# Patient Record
Sex: FEMALE | Employment: FULL TIME | URBAN - METROPOLITAN AREA
[De-identification: names, ages, dates, MRNs, and addresses within clinical notes are randomized per-mention and may not be internally consistent; named-entity substitution may affect disease eponyms.]

---

## 2017-11-08 ENCOUNTER — HOSPITAL ENCOUNTER (EMERGENCY)
Age: 32
Discharge: HOME | End: 2017-11-08
Attending: EMERGENCY MEDICINE
Payer: SELF-PAY

## 2017-11-08 VITALS
BODY MASS INDEX: 32.44 KG/M2 | HEIGHT: 64 IN | WEIGHT: 190 LBS | RESPIRATION RATE: 18 BRPM | HEART RATE: 81 BPM | SYSTOLIC BLOOD PRESSURE: 160 MMHG | OXYGEN SATURATION: 100 % | TEMPERATURE: 97.9 F | DIASTOLIC BLOOD PRESSURE: 111 MMHG

## 2017-11-08 DIAGNOSIS — L98.9 SKIN LESION OF RIGHT LEG: Primary | ICD-10-CM

## 2017-11-08 PROCEDURE — 99282 EMERGENCY DEPT VISIT SF MDM: CPT

## 2017-11-08 RX ORDER — HYDROCODONE BITARTRATE AND ACETAMINOPHEN 5; 325 MG/1; MG/1
1-2 TABLET ORAL
Qty: 12 TAB | Refills: 0 | Status: SHIPPED | OUTPATIENT
Start: 2017-11-08 | End: 2018-05-08

## 2017-11-08 RX ORDER — CEPHALEXIN 500 MG/1
500 CAPSULE ORAL 3 TIMES DAILY
Qty: 21 CAP | Refills: 0 | Status: SHIPPED | OUTPATIENT
Start: 2017-11-08 | End: 2017-11-15

## 2017-11-08 RX ORDER — SULFAMETHOXAZOLE AND TRIMETHOPRIM 800; 160 MG/1; MG/1
2 TABLET ORAL 2 TIMES DAILY
Qty: 40 TAB | Refills: 0 | Status: SHIPPED | OUTPATIENT
Start: 2017-11-08 | End: 2017-11-18

## 2017-11-09 NOTE — DISCHARGE INSTRUCTIONS

## 2017-11-09 NOTE — ED PROVIDER NOTES
HPI Comments: 26-year-old female presents to the emergency room for evaluation of insect bites to the right leg. States she noted them yesterday and they got bigger and more tender and painful today. She denies fever or chills. No nausea or vomiting. Patient does not remember an insect biting her but states she did see a spider in her house. She reports itching the area and then has to get up. She has not taken anything for it. No alleviating factors. No true known precipitating events. Pain described as aching. Pain rated 8/10. Social hx  Nonsmoker  No alcohol    Patient is a 27 y.o. female presenting with Insect Bite. The history is provided by the patient. Insect Bite   Pertinent negatives include no abdominal pain, no headaches and no shortness of breath. History reviewed. No pertinent past medical history. Past Surgical History:   Procedure Laterality Date    HX GYN               History reviewed. No pertinent family history. Social History     Social History    Marital status: SINGLE     Spouse name: N/A    Number of children: N/A    Years of education: N/A     Occupational History    Not on file. Social History Main Topics    Smoking status: Never Smoker    Smokeless tobacco: Never Used    Alcohol use No    Drug use: Not on file    Sexual activity: Not on file     Other Topics Concern    Not on file     Social History Narrative         ALLERGIES: Review of patient's allergies indicates no known allergies. Review of Systems   Constitutional: Negative for chills and fever. Respiratory: Negative for cough, chest tightness and shortness of breath. Gastrointestinal: Negative for abdominal pain, nausea and vomiting. Musculoskeletal: Negative for back pain, neck pain and neck stiffness. Skin: Positive for wound. Negative for color change and rash. Neurological: Negative for dizziness, weakness, light-headedness and headaches.        Vitals:    17 2059 diagnosis as well a list of reasons why they would want to return to the ER prior to their follow-up appointment should their condition change.                Amount and/or Complexity of Data Reviewed  Discuss the patient with other providers: yes (ER attending-[place])      ED Course       Procedures

## 2018-05-08 ENCOUNTER — OFFICE VISIT (OUTPATIENT)
Dept: FAMILY MEDICINE CLINIC | Age: 33
End: 2018-05-08

## 2018-05-08 VITALS
SYSTOLIC BLOOD PRESSURE: 148 MMHG | WEIGHT: 195.5 LBS | TEMPERATURE: 98.7 F | HEIGHT: 66 IN | DIASTOLIC BLOOD PRESSURE: 82 MMHG | RESPIRATION RATE: 20 BRPM | HEART RATE: 76 BPM | OXYGEN SATURATION: 95 % | BODY MASS INDEX: 31.42 KG/M2

## 2018-05-08 DIAGNOSIS — I10 ESSENTIAL HYPERTENSION: Primary | ICD-10-CM

## 2018-05-08 DIAGNOSIS — G43.009 MIGRAINE WITHOUT AURA AND WITHOUT STATUS MIGRAINOSUS, NOT INTRACTABLE: ICD-10-CM

## 2018-05-08 RX ORDER — SUMATRIPTAN 20 MG/1
1 SPRAY NASAL AS NEEDED
Qty: 1 CONTAINER | Refills: 0 | Status: SHIPPED | OUTPATIENT
Start: 2018-05-08 | End: 2018-05-09

## 2018-05-08 RX ORDER — AMLODIPINE BESYLATE 5 MG/1
5 TABLET ORAL DAILY
Qty: 30 TAB | Refills: 1 | Status: SHIPPED | OUTPATIENT
Start: 2018-05-08 | End: 2021-03-04 | Stop reason: ALTCHOICE

## 2018-05-08 NOTE — PROGRESS NOTES
S: Shakira Stafford is a 26 y.o. female who presents for establish care, headache, accompanied by daughter     Assessment/Plan:  Establish care  1. Essential hypertension  -hx of high BP, ED visit 1.5 yrs ago d/t high BP  -trial: amlodipine 5 mg daily  -pt to take BP 2-3x week and keep log, bring to next appt, reviewed s/s of hypo-hypertension    2. Migraine without aura and without status migrainosus, not intractable  -hx of HA, 1-2 migraines per week on average  -trial: imitrex 20mg nasal  Spray  -pt to keep log of triggers, supportive instructions given     RTC 4-6 weeks for CPE, BP check      2 migraines a week   Comes up right side of head  +nausea,   +pressure   +shoulder pain  Upset stomach  Movement makes her ill  Takes excedrin migraine and it works, but not enough  Can go to sleep and may wake up with it, but HA not as bad   Doesn't drink a lot of water   Affects her fxn at work - people comment she is slower    High BP  Went to ED 1 1/2 years ago for high BP  Gave her 3 bags of fluid then d/t dehydration  Given HCTZ but stopped taking it because it made her urinate too much    Family History:  Cardiovascular -mother    PHQ over the last two weeks 5/8/2018   Little interest or pleasure in doing things Not at all   Feeling down, depressed or hopeless Not at all   Total Score PHQ 2 0     Social History:  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     I reviewed the following:  Past Medical History:   Diagnosis Date    Headache Current Outpatient Prescriptions   Medication Sig Dispense Refill    HYDROcodone-acetaminophen (NORCO) 5-325 mg per tablet Take 1-2 Tabs by mouth every six (6) hours as needed for Pain. Max Daily Amount: 8 Tabs. 12 Tab 0    butalbital-acetaminophen-caff (FIORICET) -40 mg per capsule Take 1 Cap by mouth every four (4) hours as needed for Pain. Max Daily Amount: 6 Caps. 12 Cap 0    methocarbamol (ROBAXIN-750) 750 mg tablet Take 1 Tab by mouth four (4) times daily. 30 Tab 0       No Known Allergies    O: VS:   Visit Vitals    /82 (BP 1 Location: Left arm, BP Patient Position: Sitting)    Pulse 76    Temp 98.7 °F (37.1 °C) (Oral)    Resp 20    Ht 5' 5.5\" (1.664 m)    Wt 195 lb 8 oz (88.7 kg)    SpO2 95%    BMI 32.04 kg/m2       GENERAL: [facility] is in no acute distress. Non-toxic. Well nourished. Well developed. Appropriately groomed. HEAD:  Normocephalic. Atraumatic. Non tender sinuses x 4. EYE: PERRLA. EOMs intact. Sclera anicteric without injection. No drainage or discharge. RESP: Breath sounds are symmetrical bilaterally. Unlabored without SOB. Speaking in full sentences. Clear to auscultation bilaterally anteriorly and posteriorly. No wheezes. No rales or rhonchi. CV: normal rate. Regular rhythm. S1, S2 audible. No murmur noted. No rubs, clicks or gallops noted. No carotid bruits noted. SKIN: Skin is warm and dry. Turgor is normal.   NEURO:  awake, alert and oriented to person, place, and time and event. Clear speech. Muscle strength is +5/5 x 4 extremities. Sensation is intact to light touch bilaterally. Steady gait. HEME/LYMPH: peripheral pulses palpable 2+ x 4 extremities. No peripheral edema is noted. PSYCH: appropriate behavior, dress and thought processes. Good eye contact. Clear and coherent speech. Full affect.  Good insight.   _____________________________________________________________  Reviewed warning signs: worst HA of life, changes in vision, weakness, numbness or tingling, or fever and advised to seek immediate medical attention if any of these sx develop    Patient education was done. Advised on nutrition, physical activity, weight management, tobacco, alcohol and safety. Counseling included discussion of diagnosis, differentials, treatment options, prescribed treatment, warning signs and follow up. Medication risks/benefits,interactions and alternatives discussed with patient.      Patient verbalized understanding and agreed to plan of care. Patient was given an after visit summary which included current diagnoses, medications and vital signs. Follow up in 1-2 weeks as needed or if symptoms progress.

## 2018-05-08 NOTE — PROGRESS NOTES
Chief Complaint   Patient presents with    Establish Care    Migraine       Dorna Show  Identified pt with two pt identifiers(name and ). Chief Complaint   Patient presents with    Establish Care    Migraine       1. Have you been to the ER, urgent care clinic since your last visit? No   Hospitalized since your last visit? Yes/No    2. Have you seen or consulted any other health care providers outside of the [address] [place] [address] since your last visit? Include any pap smears or colon screening. Yes/No    Today's provider has been notified of reason for visit, vitals and flowsheets obtained on patients.      Patient received paperwork for advance directive during previous visit but has not completed at this time     Reviewed record In preparation for visit, huddled with provider and have obtained necessary documentation      Health Maintenance Due   Topic    DTaP/Tdap/Td series (1 - Tdap)    PAP AKA CERVICAL CYTOLOGY        Wt Readings from Last 3 Encounters:   18 195 lb 8 oz (88.7 kg)   17 190 lb (86.2 kg)   16 193 lb (87.5 kg)     Temp Readings from Last 3 Encounters:   18 98.7 °F (37.1 °C) (Oral)   17 97.9 °F (36.6 °C)   16 98 °F (36.7 °C)     BP Readings from Last 3 Encounters:   18 148/82   17 (!) 160/111   16 132/78     Pulse Readings from Last 3 Encounters:   18 76   17 81   16 (!) 58     Vitals:    18 1448   BP: 148/82   Pulse: 76   Resp: 20   Temp: 98.7 °F (37.1 °C)   TempSrc: Oral   SpO2: 95%   Weight: 195 lb 8 oz (88.7 kg)   Height: 5' 5.5\" (1.664 m)         Learning Assessment:  :     Learning Assessment 2018   PRIMARY LEARNER Patient   HIGHEST LEVEL OF EDUCATION - PRIMARY LEARNER  GRADUATED HIGH SCHOOL OR GED   BARRIERS PRIMARY LEARNER NONE   CO-LEARNER CAREGIVER No   PRIMARY LANGUAGE ENGLISH   LEARNER PREFERENCE PRIMARY DEMONSTRATION   ANSWERED BY patient   RELATIONSHIP SELF       Depression Screening:  : PHQ over the last two weeks 5/8/2018   Little interest or pleasure in doing things Not at all   Feeling down, depressed or hopeless Not at all   Total Score PHQ 2 0       Fall Risk Assessment:  :     No flowsheet data found. Abuse Screening:  :     No flowsheet data found. ADL Screening:  :     ADL Assessment 5/8/2018   Feeding yourself No Help Needed   Getting from bed to chair No Help Needed   Getting dressed No Help Needed   Bathing or showering No Help Needed   Walk across the room (includes cane/walker) No Help Needed   Using the telphone No Help Needed   Taking your medications No Help Needed   Preparing meals No Help Needed   Managing money (expenses/bills) No Help Needed   Moderately strenuous housework (laundry) No Help Needed   Shopping for personal items (toiletries/medicines) No Help Needed   Shopping for groceries No Help Needed   Driving No Help Needed   Climbing a flight of stairs No Help Needed   Getting to places beyond walking distances No Help Needed                 Medication reconciliation up to date and corrected with patient at this time.

## 2018-05-08 NOTE — PATIENT INSTRUCTIONS
1) Migraine  Migraine headaches are one of the most disabling illnesses in the world. Although they can affect men, women and children, women have a higher rate of migraine headaches. Lifestyle modifications that must be started to prevent migraines include eating a healthy diet, daily exercise, getting a minimum of 7-8 hours sleep at night, and stop smoking. Additionally, keep a journal of when migraines occur, food eaten, stress, etc to better identify triggers. Treatment is aimed at preventative (daily medication) or abortive (symptom management once migraine occurs). Abortive treatment includes over the counter NSAIDS, (such as Excedrin Migraine, naproxen, ibuprofen), lidocaine cream and triptans. Preventative treatment includes beta blockers, antidepressants and anticonvulsant medications. Please note that Fioricet is NOT recommended as a treatment for migraines. Imitrex (sumitriptan) nasal spray  Use when you feel a migraine coming on. Spray once in only one nostril. (Use the opposite hand of the nostril you are spraying and look down at your toes when you administer the nasal spray. This ensures the spray is applied to the nasal tissue properly.)  If no relief, can do another spray in opposite nostril in 2 hours. Do not exceed 2 sprays in 24 hour period. Complementary Alternative Medicine (CAM) for Migraines    The following therapies have been shown to help reduce the frequency and pain intensity associated with migraines:  1) Yoga - reduces anxiety/depression, helps with back pain as well. Therapy: 5 times per week  2) Aerobic exercise - affects cardiopulmonary, inflammatory and neurovascular processes. Additionally, endorphins released during exercise are \"natural\" painkllers  Therapy: individual, based on health and fitness level  2) Butterbur (Petasites hyubrius) - an herb with anti-inflammatory properties;    Dose: 50-75 mg twice a day  3) Feverfew (tanacetum parthenium) - an herb used for second line treatment with anti-inflammatory properties. Dose:  mg/daily    Massage of the trapezius and supraspinatus fibers (areas around the neck and shoulders) may help reduce pain intensity during migraines     Some migraine sufferers may have low levels of minerals and vitamins used at the cellular level. Taking supplements of Coenzyme Q10 (150-300mg/day), magnesium (400mg/daily) and/or riboflavin (400mg/daily) may help prevent migraines. There is a supplement called Migrelief - which is a combination of things that are typically low in migraine sufferers (riboflavin, magnesium, feverfew, Chasteberry (can reduce effectiveness of OBC bc estrogen), L-Theanine (amino acid) D-Biotin (BS stabilizers)    As with all supplements, herbs and vitamins, please be aware of any interactions that might occur with current medications. If headache becomes \"the worst headache of your life\", you have changes in vision, experience weakness, numbness or tingling, or spike a fever, seek immediate medical attention, call 911.      2) Healthy eating and good hydration is very important. Fair Life milk is a healthy choice in milk products. It is double filtered to remove much of the lactose (sugar found in milk) and recommended by trainers and nutritionists. There is 13 g of protein in a serving, so it is an easy way to increase your protein and calcium intake. 3) Based on your history and today's BP reading of 148/82, we will start amlodipine 5mg for high blood pressure. Please check your blood pressure through the week at staggered times in the day. (If you check in the morning, it should be at least one hour after your morning blood pressure medications.)  Arm monitors are most accurate. If you use a wrist monitor, make sure your wrist is at heart level. You can bring your home monitor to your next visit and have it calibrated with the machine in the office to gauge your readings.      Keep a written record of your blood pressure readings and bring it to each appointment. If your systolic blood pressure is consistently greater than 140mmHg or less than 100mmHg then please schedule an office meeting. Cardiac symptoms that would need immediate attention include: uncomfortable pressure, squeezing, fullness or pain in the center of your chest. Pain or discomfort in one or both arms, the back, neck, jaw or stomach. Shortness of breath with or without chest discomfort, breaking out in a cold sweat, nausea or lightheadedness.

## 2018-05-08 NOTE — MR AVS SNAPSHOT
[address] [place] 
[address] [facility] [address] 
[phone] Patient: Lloyd Hendricks MRN: [id number] JUT:[id number] Visit Information Date & Time Provider Department Dept. Phone Encounter #  
 5/8/2018  3:00 PM Abe Johnson NP [facility] [phone] Upcoming Health Maintenance Date Due DTaP/Tdap/Td series (1 - Tdap) 5/8/2018* PAP AKA CERVICAL CYTOLOGY 8/8/2018* Influenza Age 5 to Adult 8/1/2018 *Topic was postponed. The date shown is not the original due date. Allergies as of 5/8/2018  Review Complete On: 5/8/2018 By: Abe Johnson NP No Known Allergies Current Immunizations  Never Reviewed No immunizations on file. Not reviewed this visit You Were Diagnosed With   
  
 Codes Comments Essential hypertension    -  Primary ICD-10-CM: I10 
ICD-9-CM: 401.9 Migraine without aura and without status migrainosus, not intractable     ICD-10-CM: M50.690 ICD-9-CM: 346.10 Vitals BP Pulse Temp Resp Height(growth percentile) Weight(growth percentile) 148/82 (BP 1 Location: Left arm, BP Patient Position: Sitting) 76 98.7 °F (37.1 °C) (Oral) 20 5' 5.5\" (1.664 m) 195 lb 8 oz (88.7 kg) SpO2 BMI OB Status Smoking Status 95% 32.04 kg/m2 Having regular periods Never Smoker BMI and BSA Data Body Mass Index Body Surface Area 32.04 kg/m 2 2.02 m 2 Preferred Pharmacy Pharmacy Name Phone [address] [phone] Your Updated Medication List  
  
   
This list is accurate as of 5/8/18  3:35 PM.  Always use your most recent med list. amLODIPine 5 mg tablet Commonly known as:  Colon Inks Take 1 Tab by mouth daily. SUMAtriptan 20 mg/actuation nasal spray Commonly known as:  IMITREX  
1 Spray by Right Nostril route as needed for Migraine. Prescriptions Sent to Pharmacy Refills SUMAtriptan (IMITREX) 20 mg/actuation nasal spray 0 [place] by Right Nostril route as needed for Migraine. Class: Normal  
 Pharmacy: [facility] [address] [facility] Ph #: [phone] Route: Right Nostril  
 amLODIPine (NORVASC) 5 mg tablet 1 Sig: Take 1 Tab by mouth daily. Class: Normal  
 Pharmacy: [facility] [address] [facility] Ph #: [phone] Route: Oral  
  
Patient Instructions 1) Migraine Migraine headaches are one of the most disabling illnesses in the world. Although they can affect men, women and children, women have a higher rate of migraine headaches. Lifestyle modifications that must be started to prevent migraines include eating a healthy diet, daily exercise, getting a minimum of 7-8 hours sleep at night, and stop smoking. Additionally, keep a journal of when migraines occur, food eaten, stress, etc to better identify triggers. Treatment is aimed at preventative (daily medication) or abortive (symptom management once migraine occurs). Abortive treatment includes over the counter NSAIDS, (such as Excedrin Migraine, naproxen, ibuprofen), lidocaine cream and triptans. Preventative treatment includes beta blockers, antidepressants and anticonvulsant medications. Please note that Fioricet is NOT recommended as a treatment for migraines. Imitrex (sumitriptan) nasal spray Use when you feel a migraine coming on. Spray once in only one nostril. (Use the opposite hand of the nostril you are spraying and look down at your toes when you administer the nasal spray. This ensures the spray is applied to the nasal tissue properly.)  If no relief, can do another spray in opposite nostril in 2 hours. Do not exceed 2 sprays in 24 hour period. Complementary Alternative Medicine (CAM) for Migraines The following therapies have been shown to help reduce the frequency and pain intensity associated with migraines: 
1) Yoga - reduces anxiety/depression, helps with back pain as well. Therapy: 5 times per week 2) Aerobic exercise - affects cardiopulmonary, inflammatory and neurovascular processes. Additionally, endorphins released during exercise are \"natural\" painkllers Therapy: individual, based on health and fitness level 2) Butterbur (Petasites hyubrius) - an herb with anti-inflammatory properties; Dose: 50-75 mg twice a day 3) Feverfew (tanacetum parthenium) - an herb used for second line treatment with anti-inflammatory properties. Dose:  mg/daily Massage of the trapezius and supraspinatus fibers (areas around the neck and shoulders) may help reduce pain intensity during migraines Some migraine sufferers may have low levels of minerals and vitamins used at the cellular level. Taking supplements of Coenzyme Q10 (150-300mg/day), magnesium (400mg/daily) and/or riboflavin (400mg/daily) may help prevent migraines. There is a supplement called Migrelief  which is a combination of things that are typically low in migraine sufferers (riboflavin, magnesium, feverfew, Chasteberry (can reduce effectiveness of OBC bc estrogen), L-Theanine (amino acid) D-Biotin (BS stabilizers) As with all supplements, herbs and vitamins, please be aware of any interactions that might occur with current medications. If headache becomes \"the worst headache of your life\", you have changes in vision, experience weakness, numbness or tingling, or spike a fever, seek immediate medical attention, call 911. 
 
 
2) Healthy eating and good hydration is very important. Fair Life milk is a healthy choice in milk products. It is double filtered to remove much of the lactose (sugar found in milk) and recommended by trainers and nutritionists. There is 13 g of protein in a serving, so it is an easy way to increase your protein and calcium intake. 3) Based on your history and today's BP reading of 148/82, we will start amlodipine 5mg for high blood pressure. Please check your blood pressure through the week at staggered times in the day. (If you check in the morning, it should be at least one hour after your morning blood pressure medications.)  Arm monitors are most accurate. If you use a wrist monitor, make sure your wrist is at heart level. You can bring your home monitor to your next visit and have it calibrated with the machine in the office to gauge your readings. Keep a written record of your blood pressure readings and bring it to each appointment. If your systolic blood pressure is consistently greater than 140mmHg or less than 100mmHg then please schedule an office meeting. Cardiac symptoms that would need immediate attention include: uncomfortable pressure, squeezing, fullness or pain in the center of your chest. Pain or discomfort in one or both arms, the back, neck, jaw or stomach. Shortness of breath with or without chest discomfort, breaking out in a cold sweat, nausea or lightheadedness. Introducing [facility]! [employer] introduces [employer]ful patient portal. Now you can access parts of your medical record, email your doctor's office, and request medication refills online. 1. In your internet browser, go to [email or web address] 2. Click on the First Time User? Click Here link in the Sign In box. You will see the New Member Sign Up page. 3. Enter your Timeful Access Code exactly as it appears below. You will not need to use this code after youve completed the sign-up process. If you do not sign up before the expiration date, you must request a new code. · Timeful Access Code: [email or web address] Expires: 8/6/2018  3:35 PM 
 
4. Enter the last four digits of your Social Security Number (xxxx) and Date of Birth (mm/dd/yyyy) as indicated and click Submit.  You will be taken to the next sign-up page. 5. Create a Fannect ID. This will be your Fannect login ID and cannot be changed, so think of one that is secure and easy to remember. 6. Create a F[employer]ct password. You can change your password at any time. 7. Enter your Password Reset Question and Answer. This can be used at a later time if you forget your password. 8. Enter your e-mail address. You will receive e-mail notification when new information is available in [address]. 9. Click Sign Up. You can now view and download portions of your medical record. 10. Click the Download Summary menu link to download a portable copy of your medical information. If you have questions, please visit the Frequently Asked Questions section of the [employer] website. Remember, F[employer] is NOT to be used for urgent needs. For medical emergencies, dial 911. Now available from your iPhone and Android! Please provide this summary of care documentation to your next provider. Your primary care clinician is listed as NONE. If you have any questions after today's visit, please call [phone].

## 2018-05-09 ENCOUNTER — TELEPHONE (OUTPATIENT)
Dept: FAMILY MEDICINE CLINIC | Age: 33
End: 2018-05-09

## 2018-05-09 DIAGNOSIS — G43.009 MIGRAINE WITHOUT AURA AND WITHOUT STATUS MIGRAINOSUS, NOT INTRACTABLE: Primary | ICD-10-CM

## 2018-05-09 RX ORDER — RIZATRIPTAN BENZOATE 10 MG/1
10 TABLET, ORALLY DISINTEGRATING ORAL
Qty: 10 TAB | Refills: 0 | Status: SHIPPED | OUTPATIENT
Start: 2018-05-09 | End: 2021-03-04

## 2018-05-10 NOTE — TELEPHONE ENCOUNTER
----- Message from Janessa Jer sent at 5/10/2018 11:25 AM EDT -----  Regarding: NP Jennifer Aggarwal / Refill   Pt stated the medication she was prescribed for migrains is not covered by her insurance. Pt would like for the doctor to call in an alternative. Pt stated she called in yesterday with no response back.    [address]: [phone]  Fax: pt unsure  Pt's best contact: [phone]

## 2018-05-10 NOTE — TELEPHONE ENCOUNTER
----- Message from Pat Peguero sent at 5/10/2018  3:02 PM EDT -----  Regarding: Dr. Gracia Ferguson returning a missed call. If no answer please leave a message.  Best contact [phone]

## 2020-05-11 LAB — SARS-COV-2, NAA: NOT DETECTED

## 2021-03-04 ENCOUNTER — OFFICE VISIT (OUTPATIENT)
Dept: INTERNAL MEDICINE CLINIC | Age: 36
End: 2021-03-04
Payer: MEDICAID

## 2021-03-04 VITALS
HEART RATE: 64 BPM | TEMPERATURE: 98.1 F | SYSTOLIC BLOOD PRESSURE: 140 MMHG | RESPIRATION RATE: 15 BRPM | DIASTOLIC BLOOD PRESSURE: 102 MMHG | BODY MASS INDEX: 25.55 KG/M2 | OXYGEN SATURATION: 98 % | HEIGHT: 66 IN | WEIGHT: 159 LBS

## 2021-03-04 DIAGNOSIS — Z76.89 ENCOUNTER TO ESTABLISH CARE: Primary | ICD-10-CM

## 2021-03-04 DIAGNOSIS — I10 ESSENTIAL HYPERTENSION: ICD-10-CM

## 2021-03-04 DIAGNOSIS — Z86.32 HX GESTATIONAL DIABETES: ICD-10-CM

## 2021-03-04 DIAGNOSIS — Z01.419 WELL WOMAN EXAM: ICD-10-CM

## 2021-03-04 DIAGNOSIS — F43.20 ADJUSTMENT DISORDER, UNSPECIFIED TYPE: ICD-10-CM

## 2021-03-04 DIAGNOSIS — Z23 NEEDS FLU SHOT: ICD-10-CM

## 2021-03-04 LAB
25(OH)D3 SERPL-MCNC: 10.6 NG/ML (ref 30–100)
ALBUMIN SERPL-MCNC: 4 G/DL (ref 3.5–5)
ALBUMIN/GLOB SERPL: 1.1 {RATIO} (ref 1.1–2.2)
ALP SERPL-CCNC: 50 U/L (ref 45–117)
ALT SERPL-CCNC: 18 U/L (ref 12–78)
ANION GAP SERPL CALC-SCNC: 6 MMOL/L (ref 5–15)
AST SERPL-CCNC: 8 U/L (ref 15–37)
BASOPHILS # BLD: 0 K/UL (ref 0–0.1)
BASOPHILS NFR BLD: 1 % (ref 0–1)
BILIRUB SERPL-MCNC: 0.3 MG/DL (ref 0.2–1)
BUN SERPL-MCNC: 10 MG/DL (ref 6–20)
BUN/CREAT SERPL: 11 (ref 12–20)
CALCIUM SERPL-MCNC: 9.2 MG/DL (ref 8.5–10.1)
CHLORIDE SERPL-SCNC: 108 MMOL/L (ref 97–108)
CHOLEST SERPL-MCNC: 186 MG/DL
CO2 SERPL-SCNC: 25 MMOL/L (ref 21–32)
CREAT SERPL-MCNC: 0.93 MG/DL (ref 0.55–1.02)
DIFFERENTIAL METHOD BLD: NORMAL
EOSINOPHIL # BLD: 0.1 K/UL (ref 0–0.4)
EOSINOPHIL NFR BLD: 1 % (ref 0–7)
ERYTHROCYTE [DISTWIDTH] IN BLOOD BY AUTOMATED COUNT: 13.2 % (ref 11.5–14.5)
EST. AVERAGE GLUCOSE BLD GHB EST-MCNC: 103 MG/DL
GLOBULIN SER CALC-MCNC: 3.8 G/DL (ref 2–4)
GLUCOSE SERPL-MCNC: 91 MG/DL (ref 65–100)
HBA1C MFR BLD: 5.2 % (ref 4–5.6)
HCT VFR BLD AUTO: 40.3 % (ref 35–47)
HDLC SERPL-MCNC: 50 MG/DL
HDLC SERPL: 3.7 {RATIO} (ref 0–5)
HGB BLD-MCNC: 13 G/DL (ref 11.5–16)
IMM GRANULOCYTES # BLD AUTO: 0 K/UL (ref 0–0.04)
IMM GRANULOCYTES NFR BLD AUTO: 0 % (ref 0–0.5)
LDLC SERPL CALC-MCNC: 120.4 MG/DL (ref 0–100)
LIPID PROFILE,FLP: ABNORMAL
LYMPHOCYTES # BLD: 1.5 K/UL (ref 0.8–3.5)
LYMPHOCYTES NFR BLD: 39 % (ref 12–49)
MCH RBC QN AUTO: 28.8 PG (ref 26–34)
MCHC RBC AUTO-ENTMCNC: 32.3 G/DL (ref 30–36.5)
MCV RBC AUTO: 89.2 FL (ref 80–99)
MONOCYTES # BLD: 0.3 K/UL (ref 0–1)
MONOCYTES NFR BLD: 7 % (ref 5–13)
NEUTS SEG # BLD: 2.1 K/UL (ref 1.8–8)
NEUTS SEG NFR BLD: 52 % (ref 32–75)
NRBC # BLD: 0 K/UL (ref 0–0.01)
NRBC BLD-RTO: 0 PER 100 WBC
PLATELET # BLD AUTO: 226 K/UL (ref 150–400)
PMV BLD AUTO: 12.2 FL (ref 8.9–12.9)
POTASSIUM SERPL-SCNC: 4.6 MMOL/L (ref 3.5–5.1)
PROT SERPL-MCNC: 7.8 G/DL (ref 6.4–8.2)
RBC # BLD AUTO: 4.52 M/UL (ref 3.8–5.2)
SODIUM SERPL-SCNC: 139 MMOL/L (ref 136–145)
TRIGL SERPL-MCNC: 78 MG/DL (ref ?–150)
TSH SERPL DL<=0.05 MIU/L-ACNC: 1.12 UIU/ML (ref 0.36–3.74)
VLDLC SERPL CALC-MCNC: 15.6 MG/DL
WBC # BLD AUTO: 4 K/UL (ref 3.6–11)

## 2021-03-04 PROCEDURE — 90686 IIV4 VACC NO PRSV 0.5 ML IM: CPT | Performed by: NURSE PRACTITIONER

## 2021-03-04 PROCEDURE — 90471 IMMUNIZATION ADMIN: CPT | Performed by: NURSE PRACTITIONER

## 2021-03-04 PROCEDURE — 99203 OFFICE O/P NEW LOW 30 MIN: CPT | Performed by: NURSE PRACTITIONER

## 2021-03-04 RX ORDER — AMLODIPINE AND BENAZEPRIL HYDROCHLORIDE 5; 40 MG/1; MG/1
1 CAPSULE ORAL DAILY
Qty: 30 CAP | Refills: 3 | Status: SHIPPED | OUTPATIENT
Start: 2021-03-04 | End: 2021-04-02 | Stop reason: SDUPTHER
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
## 2021-03-04 NOTE — PATIENT INSTRUCTIONS
Adjustment Disorder: Care Instructions  Your Care Instructions     Adjustment disorder means that you have emotional or behavioral problems because of stress. But your response to the stress is far more severe than a normal response. It is severe enough to affect your work or social life and may cause depression and physical pains and problems. Events that may cause this response can include a divorce, money problems, or starting school or a new job. It might be anything that causes some stress. This disorder is most often a short-term problem. It happens within 3 months of the stressful event or change. If the response lasts longer than 6 months after the event ends, you may have a more serious disorder. Follow-up care is a key part of your treatment and safety. Be sure to make and go to all appointments, and call your doctor if you are having problems. It's also a good idea to know your test results and keep a list of the medicines you take. How can you care for yourself at home? · Go to all counseling sessions. Do not skip any because you are feeling better. · If your doctor prescribed medicines, take them exactly as prescribed. Call your doctor if you think you are having a problem with your medicine. You will get more details on the specific medicines your doctor prescribes. · Discuss the causes of your stress with a good friend or family member. Or you can join a support group for people with similar problems. Talking to others sometimes relieves stress. · Get at least 30 minutes of exercise on most days of the week. Walking is a good choice. You also may want to do other activities, such as running, swimming, cycling, or playing tennis or team sports. Relaxation techniques  Do relaxation exercises 10 to 20 minutes a day. You can play soothing, relaxing music while you do them, if you wish. · Tell others in your house that you are going to do your relaxation exercises.  Ask them not to disturb you.  · Find a comfortable, quiet place. · Lie down on your back, or sit with your back straight. · Focus on your breathing. Make it slow and steady. · Breathe in through your nose. Breathe out through either your nose or mouth. · Breathe deeply, filling up the area between your navel and your rib cage. Breathe so that your belly goes up and down. · Do not hold your breath. · Breathe like this for 5 to 10 minutes. Notice the feeling of calmness throughout your whole body. As you continue to breathe slowly and deeply, relax by doing these next steps for another 5 to 10 minutes:  · Tighten and relax each muscle group in your body. Start at your toes, and work your way up to your head. · Imagine your muscle groups relaxing and getting heavy. · Empty your mind of all thoughts. · Let yourself relax more and more deeply. · Be aware of the state of calmness that surrounds you. · When your relaxation time is over, you can bring yourself back to alertness by moving your fingers and toes. Then move your hands and feet. And then move your entire body. Sometimes people fall asleep during relaxation. But they most often wake up soon. · Always give yourself time to return to full alertness before you drive a car. Wait to do anything that might cause an accident if you are not fully alert. Never play a relaxation tape while you drive a car. When should you call for help? Call 911 anytime you think you may need emergency care. For example, call if:    · You feel you cannot stop from hurting yourself or someone else. Keep the numbers for these national suicide hotlines: 3-352-973-TALK (1-760.921.6915) and 3-975-TXCKLLM (7-639.934.7241). If you or someone you know talks about suicide or feeling hopeless, get help right away.    Watch closely for changes in your health, and be sure to contact your doctor if:    · You have new anxiety, or your anxiety gets worse.     · You have been feeling sad, depressed, or hopeless or have lost interest in things that you usually enjoy.     · You do not get better as expected. Where can you learn more? Go to http://www.gray.com/  Enter R087 in the search box to learn more about \"Adjustment Disorder: Care Instructions. \"  Current as of: December 16, 2019               Content Version: 12.6  © 5122-5659 [employer]. Care instructions adapted under license by [employer] (which disclaims liability or warranty for this information). If you have questions about a medical condition or this instruction, always ask your healthcare professional. [address] any warranty or liability for your use of this information. Home Blood Pressure Test: About This Test  What is it? A home blood pressure test allows you to keep track of your blood pressure at home. Blood pressure is a measure of the force of blood against the walls of your arteries. Blood pressure readings include two numbers, such as 130/80 (say \"130 over 80\"). The first number is the systolic pressure. The second number is the diastolic pressure. Why is this test done? You may do this test at home to:  · Find out if you have high blood pressure. · Track your blood pressure if you have high blood pressure. · Track how well medicine is working to reduce high blood pressure. · Check how lifestyle changes, such as weight loss and exercise, are affecting blood pressure. How do you prepare for the test?  For at least 30 minutes before you take your blood pressure, don't exercise or use caffeine, tobacco, or medicines that raise blood pressure. Take your blood pressure while you feel comfortable and relaxed. Sit quietly with both feet on the floor for at least 5 minutes before the test.  How is the test done? · Sit with your arm slightly bent and resting on a table so that your upper arm is at the same level as your heart.   · Roll up your sleeve or take off your shirt to expose your upper arm. · Wrap the blood pressure cuff around your upper arm so that the lower edge of the cuff is about 1 inch above the bend of your elbow. Proceed with the following steps depending on if you are using an automatic or manual pressure monitor. Automatic blood pressure monitors  · Press the on/off button on the automatic monitor and wait until the ready-to-measure \"heart\" symbol appears next to zero in the display window. · Press the start button. The cuff will inflate and deflate by itself. · Your blood pressure numbers will appear on the screen. · Write your numbers in your log book, along with the date and time. Manual blood pressure monitors  · Place the earpieces of a stethoscope in your ears, and place the bell of the stethoscope over the artery, just below the cuff. · Close the valve on the rubber inflating bulb. · Squeeze the bulb rapidly with your opposite hand to inflate the cuff until the dial or column of mercury reads about 30 mm Hg higher than your usual systolic pressure. If you do not know your usual pressure, inflate the cuff to 210 mm Hg or until the pulse at your wrist disappears. · Open the pressure valve just slightly by twisting or pressing the valve on the bulb. · As you watch the pressure slowly fall, note the level on the dial at which you first start to hear a pulsing or tapping sound through the stethoscope. This is your systolic blood pressure. · Continue letting the air out slowly. The sounds will become muffled and will finally disappear. Note the pressure when the sounds completely disappear. This is your diastolic blood pressure. Let out all the remaining air. · Write your numbers in your log book, along with the date and time. Follow-up care is a key part of your treatment and safety. Be sure to make and go to all appointments, and call your doctor if you are having problems. It's also a good idea to keep a list of the medicines you take.   Where can you learn more? Go to http://www.[employer].com/  Enter C427 in the search box to learn more about \"Home Blood Pressure Test: About This Test.\"  Current as of: December 16, 2019               Content Version: 12.6  © 8595-4227 [employer]. Care instructions adapted under license by [employer] (which disclaims liability or warranty for this information). If you have questions about a medical condition or this instruction, always ask your healthcare professional. [address] any warranty or liability for your use of this information. Low Sodium Diet (2,000 Milligram): Care Instructions  Your Care Instructions     Too much sodium causes your body to hold on to extra water. This can raise your blood pressure and force your heart and kidneys to work harder. In very serious cases, this could cause you to be put in the hospital. It might even be life-threatening. By limiting sodium, you will feel better and lower your risk of serious problems. The most common source of sodium is salt. People get most of the salt in their diet from canned, prepared, and packaged foods. Fast food and restaurant meals also are very high in sodium. Your doctor will probably limit your sodium to less than 2,000 milligrams (mg) a day. This limit counts all the sodium in prepared and packaged foods and any salt you add to your food. Follow-up care is a key part of your treatment and safety. Be sure to make and go to all appointments, and call your doctor if you are having problems. It's also a good idea to know your test results and keep a list of the medicines you take. How can you care for yourself at home? Read food labels  · Read labels on cans and food packages. The labels tell you how much sodium is in each serving. Make sure that you look at the serving size. If you eat more than the serving size, you have eaten more sodium.   · Food labels also tell you the Percent Daily Value for sodium. Choose products with low Percent Daily Values for sodium. · Be aware that sodium can come in forms other than salt, including monosodium glutamate (MSG), sodium citrate, and sodium bicarbonate (baking soda). MSG is often added to Asian food. When you eat out, you can sometimes ask for food without MSG or added salt. Buy low-sodium foods  · Buy foods that are labeled \"unsalted\" (no salt added), \"sodium-free\" (less than 5 mg of sodium per serving), or \"low-sodium\" (less than 140 mg of sodium per serving). Foods labeled \"reduced-sodium\" and \"light sodium\" may still have too much sodium. Be sure to read the label to see how much sodium you are getting. · Buy fresh vegetables, or frozen vegetables without added sauces. Buy low-sodium versions of canned vegetables, soups, and other canned goods. Prepare low-sodium meals  · Cut back on the amount of salt you use in cooking. This will help you adjust to the taste. Do not add salt after cooking. One teaspoon of salt has about 2,300 mg of sodium. · Take the salt shaker off the table. · Flavor your food with garlic, lemon juice, onion, vinegar, herbs, and spices. Do not use soy sauce, lite soy sauce, steak sauce, onion salt, garlic salt, celery salt, mustard, or ketchup on your food. · Use low-sodium salad dressings, sauces, and ketchup. Or make your own salad dressings and sauces without adding salt. · Use less salt (or none) when recipes call for it. You can often use half the salt a recipe calls for without losing flavor. Other foods such as rice, pasta, and grains do not need added salt. · Rinse canned vegetables, and cook them in fresh water. This removes some--but not all--of the salt. · Avoid water that is naturally high in sodium or that has been treated with water softeners, which add sodium. Call your local water company to find out the sodium content of your water supply.  If you buy bottled water, read the label and choose a sodium-free brand. Avoid high-sodium foods  · Avoid eating:  ? Smoked, cured, salted, and canned meat, fish, and poultry. ? Ham, mitchell, hot dogs, and luncheon meats. ? Regular, hard, and processed cheese and regular peanut butter. ? Crackers with salted tops, and other salted snack foods such as pretzels, chips, and salted popcorn. ? Frozen prepared meals, unless labeled low-sodium. ? Canned and dried soups, broths, and bouillon, unless labeled sodium-free or low-sodium. ? Canned vegetables, unless labeled sodium-free or low-sodium. ? [employer] fries, pizza, tacos, and other fast foods. ? Pickles, olives, ketchup, and other condiments, especially soy sauce, unless labeled sodium-free or low-sodium. Where can you learn more? Go to http://www.gray.com/  Enter V843 in the search box to learn more about \"Low Sodium Diet (2,000 Milligram): Care Instructions. \"  Current as of: August 22, 2019               Content Version: 12.6  © 7244-6198 [email or web address]. Care instructions adapted under license by [employer] (which disclaims liability or warranty for this information). If you have questions about a medical condition or this instruction, always ask your healthcare professional. [place] [address] any warranty or liability for your use of this information. High Blood Pressure: Care Instructions  Overview     It's normal for blood pressure to go up and down throughout the day. But if it stays up, you have high blood pressure. Another name for high blood pressure is hypertension. Despite what a lot of people think, high blood pressure usually doesn't cause headaches or make you feel dizzy or lightheaded. It usually has no symptoms. But it does increase your risk of stroke, heart attack, and other problems. You and your doctor will talk about your risks of these problems based on your blood pressure.   Your doctor will give you a goal for your blood pressure. Your goal will be based on your health and your age. Lifestyle changes, such as eating healthy and being active, are always important to help lower blood pressure. You might also take medicine to reach your blood pressure goal.  Follow-up care is a key part of your treatment and safety. Be sure to make and go to all appointments, and call your doctor if you are having problems. It's also a good idea to know your test results and keep a list of the medicines you take. How can you care for yourself at home? Medical treatment  · If you stop taking your medicine, your blood pressure will go back up. You may take one or more types of medicine to lower your blood pressure. Be safe with medicines. Take your medicine exactly as prescribed. Call your doctor if you think you are having a problem with your medicine. · Talk to your doctor before you start taking aspirin every day. Aspirin can help certain people lower their risk of a heart attack or stroke. But taking aspirin isn't right for everyone, because it can cause serious bleeding. · See your doctor regularly. You may need to see the doctor more often at first or until your blood pressure comes down. · If you are taking blood pressure medicine, talk to your doctor before you take decongestants or anti-inflammatory medicine, such as ibuprofen. Some of these medicines can raise blood pressure. · Learn how to check your blood pressure at home. Lifestyle changes  · Stay at a healthy weight. This is especially important if you put on weight around the waist. Losing even 10 pounds can help you lower your blood pressure. · If your doctor recommends it, get more exercise. Walking is a good choice. Bit by bit, increase the amount you walk every day. Try for at least 30 minutes on most days of the week. You also may want to swim, bike, or do other activities. · Avoid or limit alcohol.  Talk to your doctor about whether you can drink any alcohol. · Try to limit how much sodium you eat to less than 2,300 milligrams (mg) a day. Your doctor may ask you to try to eat less than 1,500 mg a day. · Eat plenty of fruits (such as bananas and oranges), vegetables, legumes, whole grains, and low-fat dairy products. · Lower the amount of saturated fat in your diet. Saturated fat is found in animal products such as milk, cheese, and meat. Limiting these foods may help you lose weight and also lower your risk for heart disease. · Do not smoke. Smoking increases your risk for heart attack and stroke. If you need help quitting, talk to your doctor about stop-smoking programs and medicines. These can increase your chances of quitting for good. When should you call for help? Call  911 anytime you think you may need emergency care. This may mean having symptoms that suggest that your blood pressure is causing a serious heart or blood vessel problem. Your blood pressure may be over 180/120. For example, call 911 if:    · You have symptoms of a heart attack. These may include:  ? Chest pain or pressure, or a strange feeling in the chest.  ? Sweating. ? Shortness of breath. ? Nausea or vomiting. ? Pain, pressure, or a strange feeling in the back, neck, jaw, or upper belly or in one or both shoulders or arms. ? Lightheadedness or sudden weakness. ? A fast or irregular heartbeat.     · You have symptoms of a stroke. These may include:  ? Sudden numbness, tingling, weakness, or loss of movement in your face, arm, or leg, especially on only one side of your body. ? Sudden vision changes. ? Sudden trouble speaking. ? Sudden confusion or trouble understanding simple statements. ? Sudden problems with walking or balance. ? A sudden, severe headache that is different from past headaches.     · You have severe back or belly pain. Do not wait until your blood pressure comes down on its own. Get help right away.   Call your doctor now or seek immediate care if:    · Your blood pressure is much higher than normal (such as 180/120 or higher), but you don't have symptoms.     · You think high blood pressure is causing symptoms, such as:  ? Severe headache.  ? Blurry vision. Watch closely for changes in your health, and be sure to contact your doctor if:    · Your blood pressure measures higher than your doctor recommends at least 2 times. That means the top number is higher or the bottom number is higher, or both.     · You think you may be having side effects from your blood pressure medicine. Where can you learn more? Go to http://www.gray.com/  Enter [id number] in the search box to learn more about \"High Blood Pressure: Care Instructions. \"  Current as of: December 16, 2019               Content Version: 12.6  © 6914-6378 [employer]. Care instructions adapted under license by [employer] (which disclaims liability or warranty for this information). If you have questions about a medical condition or this instruction, always ask your healthcare professional. [place] [address] any warranty or liability for your use of this information. Well Visit, Ages 25 to 48: Care Instructions  Your Care Instructions     Physical exams can help you stay healthy. Your doctor has checked your overall health and may have suggested ways to take good care of yourself. He or she also may have recommended tests. At home, you can help prevent illness with healthy eating, regular exercise, and other steps. Follow-up care is a key part of your treatment and safety. Be sure to make and go to all appointments, and call your doctor if you are having problems. It's also a good idea to know your test results and keep a list of the medicines you take. How can you care for yourself at home? · Reach and stay at a healthy weight.  This will lower your risk for many problems, such as obesity, diabetes, heart disease, and high blood pressure. · Get at least 30 minutes of physical activity on most days of the week. Walking is a good choice. You also may want to do other activities, such as running, swimming, cycling, or playing tennis or team sports. Discuss any changes in your exercise program with your doctor. · Do not smoke or allow others to smoke around you. If you need help quitting, talk to your doctor about stop-smoking programs and medicines. These can increase your chances of quitting for good. · Talk to your doctor about whether you have any risk factors for sexually transmitted infections (STIs). Having one sex partner (who does not have STIs and does not have sex with anyone else) is a good way to avoid these infections. · Use birth control if you do not want to have children at this time. Talk with your doctor about the choices available and what might be best for you. · Protect your skin from too much sun. When you're outdoors from 10 a.m. to 4 p.m., stay in the shade or cover up with clothing and a hat with a wide brim. Wear sunglasses that block UV rays. Even when it's cloudy, put broad-spectrum sunscreen (SPF 30 or higher) on any exposed skin. · See a dentist one or two times a year for checkups and to have your teeth cleaned. · Wear a seat belt in the car. Follow your doctor's advice about when to have certain tests. These tests can spot problems early. For everyone  · Cholesterol. Have the fat (cholesterol) in your blood tested after age 21. Your doctor will tell you how often to have this done based on your age, family history, or other things that can increase your risk for heart disease. · Blood pressure. Have your blood pressure checked during a routine doctor visit. Your doctor will tell you how often to check your blood pressure based on your age, your blood pressure results, and other factors. · Vision. Talk with your doctor about how often to have a glaucoma test.  · Diabetes.  Ask your doctor whether you should have tests for diabetes. · Colon cancer. Your risk for colorectal cancer gets higher as you get older. Some experts say that adults should start regular screening at age 48 and stop at age 76. Others say to start before age 48 or continue after age 76. Talk with your doctor about your risk and when to start and stop screening. For women  · Breast exam and mammogram. Talk to your doctor about when you should have a clinical breast exam and a mammogram. Medical experts differ on whether and how often women under 50 should have these tests. Your doctor can help you decide what is right for you. · Cervical cancer screening test and pelvic exam. Begin with a Pap test at age 24. The test often is part of a pelvic exam. Starting at age 27, you may choose to have a Pap test, an HPV test, or both tests at the same time (called co-testing). Talk with your doctor about how often to have testing. · Tests for sexually transmitted infections (STIs). Ask whether you should have tests for STIs. You may be at risk if you have sex with more than one person, especially if your partners do not wear condoms. For men  · Tests for sexually transmitted infections (STIs). Ask whether you should have tests for STIs. You may be at risk if you have sex with more than one person, especially if you do not wear a condom. · Testicular cancer exam. Ask your doctor whether you should check your testicles regularly. · Prostate exam. Talk to your doctor about whether you should have a blood test (called a PSA test) for prostate cancer. Experts differ on whether and when men should have this test. Some experts suggest it if you are older than 39 and are -American or have a father or brother who got prostate cancer when he was younger than 72. When should you call for help? Watch closely for changes in your health, and be sure to contact your doctor if you have any problems or symptoms that concern you.   Where can you learn more?  Go to http://www.gray.com/  Enter P072 in the search box to learn more about \"Well Visit, Ages 25 to 48: Care Instructions. \"  Current as of: May 27, 2020               Content Version: 12.6  © 3502-3999 [employer]. Care instructions adapted under license by [employer] (which disclaims liability or warranty for this information). If you have questions about a medical condition or this instruction, always ask your healthcare professional. [place] [address] any warranty or liability for your use of this information.

## 2021-03-06 RX ORDER — ERGOCALCIFEROL 1.25 MG/1
50000 CAPSULE ORAL
Qty: 12 CAP | Refills: 0 | Status: SHIPPED | OUTPATIENT
Start: 2021-03-06

## 2021-04-02 ENCOUNTER — OFFICE VISIT (OUTPATIENT)
Dept: INTERNAL MEDICINE CLINIC | Age: 36
End: 2021-04-02
Payer: MEDICAID

## 2021-04-02 DIAGNOSIS — I10 ESSENTIAL HYPERTENSION: Primary | ICD-10-CM

## 2021-04-02 PROCEDURE — 99213 OFFICE O/P EST LOW 20 MIN: CPT | Performed by: NURSE PRACTITIONER

## 2021-04-02 RX ORDER — AMLODIPINE AND BENAZEPRIL HYDROCHLORIDE 5; 40 MG/1; MG/1
1 CAPSULE ORAL DAILY
Qty: 30 CAP | Refills: 3 | Status: SHIPPED | OUTPATIENT
Start: 2021-04-02 | End: 2022-08-12 | Stop reason: SDUPTHER

## 2021-04-02 NOTE — PROGRESS NOTES
Rm 9  Recent Travel Screening and Travel History documentation     Travel Screening     Question   Response    In the last month, have you been in contact with someone who was confirmed or suspected to have Coronavirus / COVID-19? No / Unsure    Have you had a COVID-19 viral test in the last 14 days? No    Do you have any of the following new or worsening symptoms? None of these    Have you traveled internationally or domestically in the last month? No      Travel History   Travel since 03/02/21     No documented travel since 03/02/21          Chief Complaint   Patient presents with    Hypertension   Pt is fasting      1. Have you been to the ER, urgent care clinic since your last visit? Hospitalized since your last visit? No    2. Have you seen or consulted any other health care providers outside of the [address] [place] [address] since your last visit? Include any pap smears or colon screening.  No        Health Maintenance Due   Topic Date Due    Hepatitis C Screening  Never done    DTaP/Tdap/Td series (1 - Tdap) Never done    PAP AKA CERVICAL CYTOLOGY  Never done           3 most recent PHQ Screens 4/2/2021   Little interest or pleasure in doing things Not at all   Feeling down, depressed, irritable, or hopeless Not at all   Total Score PHQ 2 0   Trouble falling or staying asleep, or sleeping too much -   Feeling tired or having little energy -   Poor appetite, weight loss, or overeating -   Feeling bad about yourself - or that you are a failure or have let yourself or your family down -   Trouble concentrating on things such as school, work, reading, or watching TV -   Moving or speaking so slowly that other people could have noticed; or the opposite being so fidgety that others notice -   Thoughts of being better off dead, or hurting yourself in some way -   PHQ 9 Score -   How difficult have these problems made it for you to do your work, take care of your home and get along with others - Learning Assessment 5/8/2018   PRIMARY LEARNER Patient   HIGHEST LEVEL OF EDUCATION - PRIMARY LEARNER  GRADUATED HIGH SCHOOL OR GED   BARRIERS PRIMARY LEARNER NONE   CO-LEARNER CAREGIVER No   PRIMARY LANGUAGE ENGLISH   LEARNER PREFERENCE PRIMARY DEMONSTRATION   ANSWERED BY patient   RELATIONSHIP SELF         An After Visit Summary was printed and given to the patient.

## 2021-04-02 NOTE — PROGRESS NOTES
Subjective  Shabbir Durham is a 43 y.o. female presents for hypertension  follow up   HPI       Blood pressure elevated on 3/4 OV. She has restarted medications and denies ADR  No blood pressure monitoring   Works out 3 days weekly with treadmill   Follow a healthy diet    Past Medical History:   Diagnosis Date    Anxiety     Headache        No Known Allergies     Past Surgical History:   Procedure Laterality Date    HX GYN             Family History   Problem Relation Age of Onset    Hypertension Mother     Diabetes Mother     Hypertension Brother          Review of Systems   Constitutional: Negative. Respiratory: Negative. Cardiovascular: Negative. Negative for leg swelling. Gastrointestinal: Negative. Neurological: Negative for dizziness and headaches. Objective  Physical Exam  Constitutional:       Appearance: Normal appearance. Cardiovascular:      Rate and Rhythm: Normal rate and regular rhythm. Pulses: Normal pulses. Heart sounds: Normal heart sounds. Pulmonary:      Effort: Pulmonary effort is normal.      Breath sounds: Normal breath sounds. Skin:     General: Skin is warm and dry. Neurological:      General: No focal deficit present. Mental Status: She is alert and oriented to person, place, and time. Mental status is at baseline. Psychiatric:         Mood and Affect: Mood normal.         Thought Content: Thought content normal.          Assessment & Plan    ICD-10-CM ICD-9-CM    1. Essential hypertension  I10 401.9      Follow-up and Dispositions    · Return in about 3 months (around 2021) for htn, fasting labs.        current treatment plan is effective, no change in therapy  lab results and schedule of future lab studies reviewed with patient  reviewed diet, exercise and weight control  reviewed medications and side effects in detail    Patient voices understanding and acceptance of this advice and will call back if any further questions or concerns.   Godfrey Bajwa, NP

## 2021-04-02 NOTE — PATIENT INSTRUCTIONS
Home Blood Pressure Test: About This Test  What is it? A home blood pressure test allows you to keep track of your blood pressure at home. Blood pressure is a measure of the force of blood against the walls of your arteries. Blood pressure readings include two numbers, such as 130/80 (say \"130 over 80\"). The first number is the systolic pressure. The second number is the diastolic pressure. Why is this test done? You may do this test at home to:  · Find out if you have high blood pressure. · Track your blood pressure if you have high blood pressure. · Track how well medicine is working to reduce high blood pressure. · Check how lifestyle changes, such as weight loss and exercise, are affecting blood pressure. How do you prepare for the test?  For at least 30 minutes before you take your blood pressure, don't exercise, drink caffeine, or smoke. Empty your bladder before the test. Sit quietly with your back straight and both feet on the floor for at least 5 minutes. This helps you take your blood pressure while you feel comfortable and relaxed. How is the test done? · If your doctor recommends it, take your blood pressure twice a day. Take it in the morning and evening. · Sit with your arm slightly bent and resting on a table so that your upper arm is at the same level as your heart. · Use the same arm each time you take your blood pressure. · Place the blood pressure cuff on the bare skin of your upper arm. You may have to roll up your sleeve, remove your arm from the sleeve, or take your shirt off. · Wrap the blood pressure cuff around your upper arm so that the lower edge of the cuff is about 1 inch above the bend of your elbow. · Do not move, talk, or text while you take your blood pressure. Follow the instructions that came with your blood pressure monitor. They might be different from the following. · Press the on/off button on the automatic monitor.  Then you may need to wait until the screen says the monitor is ready. · Press the start button. The cuff will inflate and deflate by itself. · Your blood pressure numbers will appear on the screen. · Wait one minute and take your blood pressure again. · If your monitor does not automatically save your numbers, write them in your log book, along with the date and time. Follow-up care is a key part of your treatment and safety. Be sure to make and go to all appointments, and call your doctor if you are having problems. It's also a good idea to keep a list of the medicines you take. Where can you learn more? Go to http://www.galvez.com/  Enter C427 in the search box to learn more about \"Home Blood Pressure Test: About This Test.\"  Current as of: August 31, 2020               Content Version: 12.8  © 2725-0670 [employer] Care instructions adapted under license by [employer] (which disclaims liability or warranty for this information). If you have questions about a medical condition or this instruction, always ask your healthcare professional. [place] [address] any warranty or liability for your use of this information. Learning About High Blood Pressure  What is high blood pressure? Blood pressure is a measure of how hard the blood pushes against the walls of your arteries. It's normal for blood pressure to go up and down throughout the day. But if it stays up, you have high blood pressure. Another name for high blood pressure is hypertension. Two numbers tell you your blood pressure. The first number is the systolic pressure (top number). It shows how hard the blood pushes when your heart is pumping. The second number is the diastolic pressure (bottom number). It shows how hard the blood pushes between heartbeats, when your heart is relaxed and filling with blood. Your doctor will give you a goal for your blood pressure based on your health and your age.  High blood pressure (hypertension) means that the top number stays high, or the bottom number stays high, or both. High blood pressure increases the risk of stroke, heart attack, and other problems. What happens when you have high blood pressure? · Blood flows through your arteries with too much force. Over time, this can damage the heart and the walls of your arteries. But you can't feel it. High blood pressure usually doesn't cause symptoms. · High blood pressure makes your heart work harder. And that can lead to heart failure, which means your heart doesn't pump as much blood as your body needs. · Fat and calcium start to build up in your arteries. This buildup is called hardening of the arteries. It can cause many problems including a heart attack and stroke. · Arteries also carry blood and oxygen to organs like your eyes, kidneys, and brain. If high blood pressure damages those arteries, it can lead to vision loss, kidney disease, stroke, and a higher risk of dementia. How can you prevent high blood pressure? · Stay at a healthy weight. · Try to limit how much sodium you eat to less than 2,300 milligrams (mg) a day. If you limit your sodium to 1,500 mg a day, you can lower your blood pressure even more. ? Buy foods that are labeled \"unsalted,\" \"sodium-free,\" or \"low-sodium. \" Foods labeled \"reduced-sodium\" and \"light sodium\" may still have too much sodium. ? Flavor your food with garlic, lemon juice, onion, vinegar, herbs, and spices instead of salt. Do not use soy sauce, steak sauce, onion salt, garlic salt, mustard, or ketchup on your food. ? Use less salt (or none) when recipes call for it. You can often use half the salt a recipe calls for without losing flavor. · Be physically active. Get at least 30 minutes of exercise on most days of the week. Walking is a good choice. You also may want to do other activities, such as running, swimming, cycling, or playing tennis or team sports.   · Limit alcohol to 2 drinks a day for men and 1 drink a day for women. · Eat plenty of fruits, vegetables, and low-fat dairy products. Eat less saturated and total fats. How is high blood pressure treated? · Your doctor will suggest making lifestyle changes to help your heart. For example, your doctor may ask you to eat healthy foods, quit smoking, lose extra weight, and be more active. · If lifestyle changes don't help enough, your doctor may recommend that you take medicine. · When blood pressure is very high, medicines are needed to lower it. Follow-up care is a key part of your treatment and safety. Be sure to make and go to all appointments, and call your doctor if you are having problems. It's also a good idea to know your test results and keep a list of the medicines you take. Where can you learn more? Go to http://www.galvez.com/  Enter P501 in the search box to learn more about \"Learning About High Blood Pressure. \"  Current as of: August 31, 2020               Content Version: 12.8  © 2006-2021 [employer]. Care instructions adapted under license by [employer] (which disclaims liability or warranty for this information). If you have questions about a medical condition or this instruction, always ask your healthcare professional. [address] any warranty or liability for your use of this information.

## 2021-04-07 VITALS
HEART RATE: 70 BPM | HEIGHT: 66 IN | TEMPERATURE: 98.4 F | WEIGHT: 161.6 LBS | SYSTOLIC BLOOD PRESSURE: 130 MMHG | OXYGEN SATURATION: 99 % | BODY MASS INDEX: 25.97 KG/M2 | RESPIRATION RATE: 16 BRPM | DIASTOLIC BLOOD PRESSURE: 90 MMHG

## 2021-11-22 ENCOUNTER — APPOINTMENT (OUTPATIENT)
Dept: GENERAL RADIOLOGY | Age: 36
End: 2021-11-22
Attending: NURSE PRACTITIONER
Payer: MEDICAID

## 2021-11-22 ENCOUNTER — HOSPITAL ENCOUNTER (EMERGENCY)
Age: 36
Discharge: HOME | End: 2021-11-22
Attending: EMERGENCY MEDICINE | Admitting: EMERGENCY MEDICINE
Payer: MEDICAID

## 2021-11-22 VITALS
SYSTOLIC BLOOD PRESSURE: 198 MMHG | OXYGEN SATURATION: 95 % | HEART RATE: 68 BPM | DIASTOLIC BLOOD PRESSURE: 98 MMHG | RESPIRATION RATE: 16 BRPM | TEMPERATURE: 97 F

## 2021-11-22 DIAGNOSIS — S89.91XA INJURY OF RIGHT KNEE, INITIAL ENCOUNTER: Primary | ICD-10-CM

## 2021-11-22 PROCEDURE — 99282 EMERGENCY DEPT VISIT SF MDM: CPT

## 2021-11-22 PROCEDURE — 73562 X-RAY EXAM OF KNEE 3: CPT

## 2021-11-22 RX ORDER — IBUPROFEN 600 MG/1
600 TABLET ORAL
Qty: 20 TABLET | Refills: 0 | Status: SHIPPED | OUTPATIENT
Start: 2021-11-22

## 2021-11-22 NOTE — ED NOTES
Knee Immobilizer applied. I have given crutches to the patient, adjusted them and provided complete instructions on safe use.

## 2021-11-22 NOTE — ED PROVIDER NOTES
This is a 78-year-old female who presents to the emergency room with complaints of right knee pain. Patient also had she has right hip pain. States that she was trying to get groceries out of the car when she fell trying to catch them, falling onto her sister. States she was okay yesterday but today the pain has worsened causing her to walk with a limp. States that she has swelling at her right knee. Mild bruising to her right hip. Denies any chest pain, shortness of breath, dizziness, nausea or vomiting, fever or chills. Did not hit her head, no loss of consciousness. Does not take blood thinners. Denies any other pain. Has not taken any medication prior to arrival for her symptoms. There are no further complaints at this time. None  Past Medical History:  No date:  Anxiety  No date: Headache  Past Surgical History:  No date: HX GYN      Comment:                 Past Medical History:   Diagnosis Date    Anxiety     Headache        Past Surgical History:   Procedure Laterality Date    HX GYN               Family History:   Problem Relation Age of Onset    Hypertension Mother     Diabetes Mother     Hypertension Brother        Social History     Socioeconomic History    Marital status: SINGLE     Spouse name: Not on file    Number of children: Not on file    Years of education: Not on file    Highest education level: Not on file   Occupational History    Not on file   Tobacco Use    Smoking status: Never Smoker    Smokeless tobacco: Never Used   Vaping Use    Vaping Use: Never used   Substance and Sexual Activity    Alcohol use: No    Drug use: Never    Sexual activity: Not Currently   Other Topics Concern    Not on file   Social History Narrative    Not on file     Social Determinants of Health     Financial Resource Strain:     Difficulty of Paying Living Expenses: Not on file   Food Insecurity:     Worried About Running Out of Food in the Last Year: Not on file  Ran Out of Food in the Last Year: Not on file   Transportation Needs:     Lack of Transportation (Medical): Not on file    Lack of Transportation (Non-Medical): Not on file   Physical Activity:     Days of Exercise per Week: Not on file    Minutes of Exercise per Session: Not on file   Stress:     Feeling of Stress : Not on file   Social Connections:     Frequency of Communication with Friends and Family: Not on file    Frequency of Social Gatherings with Friends and Family: Not on file    Attends Denominational Services: Not on file    Active Member of [address] [place] [address] [employer] or Organizations: Not on file    Attends Club or Organization Meetings: Not on file    Marital Status: Not on file   Intimate Partner Violence:     Fear of Current or Ex-Partner: Not on file    Emotionally Abused: Not on file    Physically Abused: Not on file    Sexually Abused: Not on file   Housing Stability:     Unable to Pay for Housing in the Last Year: Not on file    Number of [place] in the Last Year: Not on file    Unstable Housing in the Last Year: Not on file         ALLERGIES: Patient has no known allergies. Review of Systems   Constitutional: Negative for appetite change, chills, diaphoresis, fatigue and fever. HENT: Negative for congestion, ear discharge, ear pain, sinus pressure, sinus pain, sore throat and trouble swallowing. Eyes: Negative for photophobia, pain, redness and visual disturbance. Respiratory: Negative for chest tightness, shortness of breath and wheezing. Cardiovascular: Negative for chest pain and palpitations. Gastrointestinal: Negative for abdominal distention, abdominal pain, nausea and vomiting. Endocrine: Negative. Genitourinary: Negative for difficulty urinating, flank pain, frequency and urgency. Musculoskeletal: Positive for arthralgias (right knee pain, right hip bruising). Negative for back pain, neck pain and neck stiffness.    Skin: Negative for color change, pallor, rash and wound. Allergic/Immunologic: Negative. Neurological: Negative for dizziness, speech difficulty, weakness and headaches. Hematological: Does not bruise/bleed easily. Psychiatric/Behavioral: Negative for behavioral problems. The patient is not nervous/anxious. Vitals:    11/22/21 0955   BP: (!) 198/98   Pulse: 68   Resp: 16   Temp: 97 °F (36.1 °C)   SpO2: 95%            Physical Exam  Vitals and nursing note reviewed. Constitutional:       General: She is not in acute distress. Appearance: Normal appearance. She is well-developed. She is not ill-appearing. HENT:      Head: Normocephalic and atraumatic. Right Ear: External ear normal.      Left Ear: External ear normal.      Nose: Nose normal. No congestion. Mouth/Throat:      Mouth: Mucous membranes are moist.   Eyes:      General:         Right eye: No discharge. Left eye: No discharge. Conjunctiva/sclera: Conjunctivae normal.      Pupils: Pupils are equal, round, and reactive to light. Neck:      Vascular: No JVD. Trachea: No tracheal deviation. Cardiovascular:      Rate and Rhythm: Normal rate and regular rhythm. Pulses: Normal pulses. Heart sounds: Normal heart sounds. No murmur heard. No gallop. Pulmonary:      Effort: Pulmonary effort is normal. No respiratory distress. Breath sounds: Normal breath sounds. No wheezing or rales. Chest:      Chest wall: No tenderness. Abdominal:      General: Bowel sounds are normal. There is no distension. Palpations: Abdomen is soft. Tenderness: There is no abdominal tenderness. There is no guarding or rebound. Genitourinary:     Comments: Negative    Musculoskeletal:         General: Tenderness (right hip bruising, right knee pain and swelling) present. Normal range of motion. Cervical back: Normal range of motion and neck supple. Skin:     General: Skin is warm and dry.       Capillary Refill: Capillary refill takes less than 2 seconds. Coloration: Skin is not pale. Findings: No erythema or rash. Neurological:      General: No focal deficit present. Mental Status: She is alert and oriented to person, place, and time. Sensory: No sensory deficit. Motor: No weakness. Coordination: Coordination normal.      Comments: Positive palpable pulses, good capillary refill. Right knee with swelling. No neurological deficits. Psychiatric:         Mood and Affect: Mood normal.         Behavior: Behavior normal.         Thought Content: Thought content normal.         Judgment: Judgment normal.          MDM  Number of Diagnoses or Management Options  Injury of right knee, initial encounter: new and requires workup  Diagnosis management comments: Differential diagnosis includes knee sprain, patellar fracture, hip strain and others. After physical examination no indication for hip film. Mild contusion. Imaging to right knee. Noted to have a joint effusion. Put in a knee immobilizer and to follow-up with orthopedics as an outpatient. Also follow-up with PCP. Return to the emergency room with worsening symptoms. Patient is in agreement with plan of care. Amount and/or Complexity of Data Reviewed  Tests in the radiology section of CPT®: ordered and reviewed         Labs Reviewed - No data to display  XR KNEE RT 3 V    Result Date: 11/22/2021  No acute fracture or dislocation. 11:22 AM  Pt has been reexamined. Pt has no new complaints, changes or physical findings. Care plan outlined and precautions discussed. All available results were reviewed with pt. All medications were reviewed with pt. All of pt's questions and concerns were addressed. Pt agrees to F/U as instructed and agrees to return to ED upon further deterioration. Pt is ready to go home. Laine Farooq NP    Please note that this dictation was completed with [employer], the [employer] voice recognition software.   Quite often unanticipated grammatical, syntax, homophones, and other interpretive errors are inadvertently transcribed by the computer software. Please disregard these errors. Please excuse any errors that have escaped final proofreading. Thank you.     Procedures

## 2021-12-02 NOTE — PROGRESS NOTES
I reviewed the note and agree with their assessment and plan. ASSESSMENT/PLAN:  Below is the assessment and plan developed based on review of pertinent history, physical exam, labs, studies, and medications. 1. Acute medial meniscus tear of right knee, initial encounter      Return for Follow-up after diagnostic test.      We discussed with the patient that with the fall and the amount of fluid she has any, or concern that she has an injury to her meniscus or some other intra-articular structure so we will proceed with an MRI of her right knee. We will limit her to working 8 days out of the typical 10 that she does. We will keep her limited to work only in a seated position. We will have her follow-up after the MRI to discuss the results and further treatment options at that time. Given that the patient's symptoms are increasing in frequency and duration, we have decided to evaluate the etiology of the pain and loss of function with an MRI. We discussed the risk of an MRI which include, but are not limited to the enclosed space, noisy environment, magnetic effect on implant metal.  We also talked about the fact that MRI is also contraindicated in the presence of internal metallic object such as bullets or shrapnel, as well as surgical clips, pins, plates, screws, metal sutures, or wire mesh. We talked about the fact that MRI does not use radiation but it may be contraindicated if the patient has implanted pacemakers, intracranial aneurysm clips, cochlear implants, certain prosthetic devices, implant drug infusion pumps, neurostimulator, bone growth stimulators, start intrauterine contraceptive devices, or any other type of iron based metal implants. We discussed the fact that if you are pregnant or suspect that you may be pregnant, you should notify your physician consult with your primary care or obstetrician before having an MRI.   Although rare, we talked about the fact that if contrast dye is used, there is a risk for allergic reaction to the dye. Patients who are allergic to or sensitive to medications, contrast dye, iodine, or shellfish to notify the radiologist or technologist prior to the administration of the dye. MRI contrast may also have an effect on their condition such as allergies, asthma, anemia, hypertension, and sickle cell disease. The patient has expressed understanding of these risks and I will see the patient back after the MRI to discuss the findings as well as the treatment options. SUBJECTIVE/OBJECTIVE:  Rona Kaplan (: 1985) is a 43 y.o. female, patient,here for evaluation of the Knee Pain   States that she was trying to get groceries out of the car when she jumped the last 5 steps to get down to the car and try to catch them, falling onto her sister. States that she has swelling at her right knee. Denies any chest pain, shortness of breath, dizziness, nausea or vomiting, fever or chills. Did not hit her head, no loss of consciousness. Does not take blood thinners. Denies any other pain. Has not taken any medication prior to arrival for her symptoms. No prior knee injuries. States she has had difficulty straightening her knee. Physical Exam    General: Well dressed, well nourished female in NAD, A/O x 3  Right knee: Moderate effusion on exam. Motion from 5 - 130 degrees. No crepitus with ROM of the knee. Stable to varus/valgus stress testing at 0 and 30 degrees. Negative Lachmans, negative anterior/posterior drawer. Calf is soft and supple, no tenderness over the calf. SILT distally in the leg and foot. Imaging:    INDICATION:  right knee pain      Exam: AP, lateral, oblique views of the right knee.     FINDINGS: There is no acute fracture-dislocation. Articulations are normal.  Bones are well-mineralized.  There is a probable suprapatellar joint effusion.     IMPRESSION  No acute fracture or dislocation    No Known Allergies    Current Outpatient Medications   Medication Sig    ibuprofen (MOTRIN) 600 mg tablet Take 1 Tablet by mouth every six (6) hours as needed for Pain.  amLODIPine-benazepril (LOTREL) 5-40 mg per capsule Take 1 Cap by mouth daily.  ergocalciferol (ERGOCALCIFEROL) 1,250 mcg (50,000 unit) capsule Take 1 Cap by mouth every seven (7) days. No current facility-administered medications for this visit. Past Medical History:   Diagnosis Date    Anxiety     Headache        Past Surgical History:   Procedure Laterality Date    HX GYN             Family History   Problem Relation Age of Onset    Hypertension Mother     Diabetes Mother     Hypertension Brother        Social History     Socioeconomic History    Marital status: SINGLE     Spouse name: Not on file    Number of children: Not on file    Years of education: Not on file    Highest education level: Not on file   Occupational History    Not on file   Tobacco Use    Smoking status: Never Smoker    Smokeless tobacco: Never Used   Vaping Use    Vaping Use: Never used   Substance and Sexual Activity    Alcohol use: No    Drug use: Never    Sexual activity: Not Currently   Other Topics Concern    Not on file   Social History Narrative    Not on file     Social Determinants of Health     Financial Resource Strain:     Difficulty of Paying Living Expenses: Not on file   Food Insecurity:     Worried About [address] [employer] in the Last Year: Not on file    Nayan of Food in the Last Year: Not on file   Transportation Needs:     Lack of Transportation (Medical): Not on file    Lack of Transportation (Non-Medical):  Not on file   Physical Activity:     Days of Exercise per Week: Not on file    Minutes of Exercise per Session: Not on file   Stress:     Feeling of Stress : Not on file   Social Connections:     Frequency of Communication with Friends and Family: Not on file    Frequency of Social Gatherings with Friends and Family: Not on file   Toby Avila Attends Restoration Services: Not on file    Active Member of Clubs or Organizations: Not on file    Attends Club or Organization Meetings: Not on file    Marital Status: Not on file   Intimate Partner Violence:     Fear of Current or Ex-Partner: Not on file    Emotionally Abused: Not on file    Physically Abused: Not on file    Sexually Abused: Not on file   Housing Stability:     Unable to Pay for Housing in the Last Year: Not on file    Number of [place] in the Last Year: Not on file    Unstable Housing in the Last Year: Not on file       Review of Systems    Pain Assessment  12/3/2021   Location of Pain Knee   Location Modifiers Right   Quality of Pain Dull;Aching; Sharp   Duration of Pain Persistent   Frequency of Pain Constant   Limiting Behavior Some   Result of Injury Yes   Work-Related Injury No       Vitals:  Ht 5' 4\" (1.626 m)   Wt 156 lb (70.8 kg)   BMI 26.78 kg/m²    Body mass index is 26.78 kg/m². An electronic signature was used to authenticate this note.   -- Terrence Toribio MD

## 2021-12-03 ENCOUNTER — OFFICE VISIT (OUTPATIENT)
Dept: ORTHOPEDIC SURGERY | Age: 36
End: 2021-12-03
Payer: MEDICAID

## 2021-12-03 VITALS — WEIGHT: 156 LBS | HEIGHT: 64 IN | BODY MASS INDEX: 26.63 KG/M2

## 2021-12-03 DIAGNOSIS — S83.241A ACUTE MEDIAL MENISCUS TEAR OF RIGHT KNEE, INITIAL ENCOUNTER: Primary | ICD-10-CM

## 2021-12-03 PROCEDURE — 99204 OFFICE O/P NEW MOD 45 MIN: CPT | Performed by: ORTHOPAEDIC SURGERY

## 2021-12-03 NOTE — LETTER
12/3/2021 8:44 AM    Patient:  Javon Allan   YOB: 1985  Date of Visit: 12/3/2021      Romina Juarez can work 8 days out of a 14-day period. We will keep her working at a desk, seated most of the day with the ability to take small breaks throughout the day. We will re-assess in a few weeks.               Sincerely,      Elizabeth Urbina MD

## 2022-02-01 ENCOUNTER — CLINICAL SUPPORT (OUTPATIENT)
Dept: INTERNAL MEDICINE CLINIC | Age: 37
End: 2022-02-01

## 2022-02-01 DIAGNOSIS — Z11.1 SCREENING-PULMONARY TB: Primary | ICD-10-CM

## 2022-02-04 LAB
GAMMA INTERFERON BACKGROUND BLD IA-ACNC: 0 IU/ML
M TB IFN-G BLD-IMP: NEGATIVE
M TB IFN-G CD4+ BCKGRND COR BLD-ACNC: 0 IU/ML
MITOGEN IGNF BLD-ACNC: >10 IU/ML
QUANTIFERON INCUBATION, QF1T: NORMAL
QUANTIFERON TB2 AG: 0 IU/ML
SERVICE CMNT-IMP: NORMAL

## 2022-03-19 PROBLEM — G43.009 MIGRAINE WITHOUT AURA AND WITHOUT STATUS MIGRAINOSUS, NOT INTRACTABLE: Status: ACTIVE | Noted: 2018-05-08

## 2022-03-19 PROBLEM — I10 ESSENTIAL HYPERTENSION: Status: ACTIVE | Noted: 2018-05-08

## 2022-08-12 ENCOUNTER — OFFICE VISIT (OUTPATIENT)
Dept: INTERNAL MEDICINE CLINIC | Age: 37
End: 2022-08-12

## 2022-08-12 VITALS
DIASTOLIC BLOOD PRESSURE: 96 MMHG | HEART RATE: 60 BPM | BODY MASS INDEX: 25.03 KG/M2 | WEIGHT: 146.6 LBS | OXYGEN SATURATION: 99 % | HEIGHT: 64 IN | RESPIRATION RATE: 19 BRPM | SYSTOLIC BLOOD PRESSURE: 161 MMHG | TEMPERATURE: 98.5 F

## 2022-08-12 DIAGNOSIS — E78.00 PURE HYPERCHOLESTEROLEMIA: ICD-10-CM

## 2022-08-12 DIAGNOSIS — I10 ESSENTIAL HYPERTENSION: ICD-10-CM

## 2022-08-12 DIAGNOSIS — R00.1 BRADYCARDIA: ICD-10-CM

## 2022-08-12 DIAGNOSIS — Z11.59 NEED FOR HEPATITIS C SCREENING TEST: ICD-10-CM

## 2022-08-12 DIAGNOSIS — Z01.419 ENCOUNTER FOR GYNECOLOGICAL EXAMINATION: ICD-10-CM

## 2022-08-12 DIAGNOSIS — Z00.00 PHYSICAL EXAM, ANNUAL: Primary | ICD-10-CM

## 2022-08-12 PROCEDURE — 99395 PREV VISIT EST AGE 18-39: CPT | Performed by: NURSE PRACTITIONER

## 2022-08-12 RX ORDER — AMLODIPINE AND BENAZEPRIL HYDROCHLORIDE 5; 40 MG/1; MG/1
1 CAPSULE ORAL DAILY
Qty: 90 CAPSULE | Refills: 3 | Status: SHIPPED | OUTPATIENT
Start: 2022-08-12

## 2022-08-12 RX ORDER — CHLORTHALIDONE 25 MG/1
25 TABLET ORAL DAILY
Qty: 90 TABLET | Refills: 0 | Status: SHIPPED | OUTPATIENT
Start: 2022-08-12

## 2022-08-12 NOTE — PROGRESS NOTES
Subjective  Bo La is a 42 y.o. female presents for physical exam   HPI    Past Medical History:   Diagnosis Date    Anxiety     Headache         Current Outpatient Medications   Medication Sig    chlorthalidone (HYGROTON) 25 mg tablet Take 1 Tablet by mouth in the morning. amLODIPine-benazepril (LOTREL) 5-40 mg per capsule Take 1 Capsule by mouth in the morning. ibuprofen (MOTRIN) 600 mg tablet Take 1 Tablet by mouth every six (6) hours as needed for Pain.    ergocalciferol (ERGOCALCIFEROL) 1,250 mcg (50,000 unit) capsule Take 1 Cap by mouth every seven (7) days. No current facility-administered medications for this visit. Allergies   Allergen Reactions    Pollen Extracts Runny Nose and Itching      Family History   Problem Relation Age of Onset    Hypertension Mother     Diabetes Mother     Hypertension Brother     No recent gyn exam   Menses regular   Denies interim change in medical management or history   Follow a healthy diet  She has been working out   New partner since April; happy with relationship   Received COVID vaccine and booster       Review of Systems   Constitutional: Negative. HENT: Negative. Eyes: Negative. Cardiovascular: Negative. Gastrointestinal: Negative. Genitourinary: Negative. Musculoskeletal: Negative. Skin: Negative. Neurological: Negative. Psychiatric/Behavioral: Negative. Objective  Visit Vitals  BP (!) 161/96 (BP 1 Location: Left upper arm, BP Patient Position: Sitting, BP Cuff Size: Adult)   Pulse 60   Temp 98.5 °F (36.9 °C) (Oral)   Resp 19   Ht 5' 4\" (1.626 m)   Wt 146 lb 9.6 oz (66.5 kg)   SpO2 99%   BMI 25.16 kg/m²      Physical Exam  Constitutional:       General: She is not in acute distress. Appearance: Normal appearance. She is not ill-appearing. HENT:      Head: Normocephalic and atraumatic.       Right Ear: Tympanic membrane, ear canal and external ear normal.      Left Ear: Tympanic membrane, ear canal and external ear normal.      Mouth/Throat:      Mouth: Mucous membranes are moist.   Eyes:      Pupils: Pupils are equal, round, and reactive to light. Neck:      Vascular: No carotid bruit. Cardiovascular:      Rate and Rhythm: Regular rhythm. Tachycardia present. Pulses: Normal pulses. Heart sounds: Normal heart sounds. Abdominal:      General: Bowel sounds are normal. There is no distension. Palpations: Abdomen is soft. There is no mass. Tenderness: There is no abdominal tenderness. Hernia: No hernia is present. Musculoskeletal:         General: No tenderness. Cervical back: Normal range of motion and neck supple. Right lower leg: No edema. Left lower leg: No edema. Lymphadenopathy:      Cervical: No cervical adenopathy. Skin:     General: Skin is warm and dry. Neurological:      Mental Status: She is alert. Mental status is at baseline. Psychiatric:         Mood and Affect: Mood normal.         Behavior: Behavior normal.         Thought Content: Thought content normal.        Assessment & Plan    ICD-10-CM ICD-9-CM    1. Physical exam, annual  Z00.00 V70.0 LIPID PANEL      METABOLIC PANEL, COMPREHENSIVE      CBC WITH AUTOMATED DIFF      HEMOGLOBIN A1C WITH EAG      CBC WITH AUTOMATED DIFF      HEMOGLOBIN A1C WITH EAG      2. Essential hypertension  X02 580.1 METABOLIC PANEL, COMPREHENSIVE      METABOLIC PANEL, COMPREHENSIVE      chlorthalidone (HYGROTON) 25 mg tablet      amLODIPine-benazepril (LOTREL) 5-40 mg per capsule      3. Pure hypercholesterolemia  E78.00 272.0 LIPID PANEL      METABOLIC PANEL, COMPREHENSIVE      LIPID PANEL      4. Bradycardia  R00.1 427.89 TSH 3RD GENERATION      T4, FREE      5. Encounter for gynecological examination  Z01.419 V72.31 REFERRAL TO OBSTETRICS AND GYNECOLOGY      6. Need for hepatitis C screening test  Z11.59 V73.89 HEPATITIS C AB        Diagnoses and all orders for this visit:    1.  Physical exam, annual  -     LIPID PANEL; Future  -     METABOLIC PANEL, COMPREHENSIVE; Future  -     CBC WITH AUTOMATED DIFF; Future  -     HEMOGLOBIN A1C WITH EAG; Future  -     CBC WITH AUTOMATED DIFF; Future  -     HEMOGLOBIN A1C WITH EAG; Future    2. Essential hypertension  -     METABOLIC PANEL, COMPREHENSIVE; Future  -     METABOLIC PANEL, COMPREHENSIVE; Future  -     chlorthalidone (HYGROTON) 25 mg tablet; Take 1 Tablet by mouth in the morning.  -     amLODIPine-benazepril (LOTREL) 5-40 mg per capsule; Take 1 Capsule by mouth in the morning. 3. Pure hypercholesterolemia  -     LIPID PANEL; Future  -     METABOLIC PANEL, COMPREHENSIVE; Future  -     LIPID PANEL; Future    4. Bradycardia  -     TSH 3RD GENERATION; Future  -     T4, FREE; Future    5. Encounter for gynecological examination  -     REFERRAL TO OBSTETRICS AND GYNECOLOGY    6. Need for hepatitis C screening test  -     HEPATITIS C AB; Future    Follow-up and Dispositions    Return in about 4 weeks (around 9/9/2022) for htn. HTN uncontrolled. discussed need for medication adjustment; diuretic added.  Encouraged BP monitoring, reviewed lifestyle management and follow up  lab results and schedule of future lab studies reviewed with patient  reviewed diet, exercise and weight control  cardiovascular risk and specific lipid/LDL goals reviewed  reviewed medications and side effects in detail  Sveta Brar NP

## 2022-08-12 NOTE — PROGRESS NOTES
Rm: 09    Pt states that she wants an referral for [facility]'s     Pt first reading BP was 189/112         Chief Complaint   Patient presents with    Complete Physical       Visit Vitals  BP (!) 161/96 (BP 1 Location: Left upper arm, BP Patient Position: Sitting, BP Cuff Size: Adult)   Pulse 60   Temp 98.5 °F (36.9 °C) (Oral)   Resp 19   Ht 5' 4\" (1.626 m)   Wt 146 lb 9.6 oz (66.5 kg)   SpO2 99%   BMI 25.16 kg/m²       1. Have you been to the ER, urgent care clinic since your last visit? Hospitalized since your last visit? No    2. Have you seen or consulted any other health care providers outside of the [address] [place] [address] since your last visit? Include any pap smears or colon screening.  No

## 2023-08-29 ENCOUNTER — TELEPHONE (OUTPATIENT)
Age: 38
End: 2023-08-29

## 2023-08-29 NOTE — TELEPHONE ENCOUNTER
Left VM for pt to call office back. If pt calls back she would like to schedule a new pt appt with one of our providers in the office. Please schedule pt next available. -TM 8/29/23

## 2023-08-29 NOTE — TELEPHONE ENCOUNTER
----- Message from Ingrid Carlos sent at 8/29/2023  2:39 PM EDT -----  Subject: Appointment Request    Reason for Call: New Patient/New to Provider Appointment needed: Routine   Physical Exam with PAP    QUESTIONS    Reason for appointment request? No appointments available during search     Additional Information for Provider? New pt called to schedule appt for   PAP and bloodwork.  No upcoming appts available.   ---------------------------------------------------------------------------  --------------  Brian GALVEZ  [phone]; OK to leave message on voicemail  ---------------------------------------------------------------------------  --------------  SCRIPT ANSWERS